# Patient Record
Sex: MALE | Race: WHITE | URBAN - METROPOLITAN AREA
[De-identification: names, ages, dates, MRNs, and addresses within clinical notes are randomized per-mention and may not be internally consistent; named-entity substitution may affect disease eponyms.]

---

## 2020-06-18 ENCOUNTER — OFFICE VISIT (OUTPATIENT)
Dept: ORTHOPEDICS | Facility: CLINIC | Age: 40
End: 2020-06-18
Payer: COMMERCIAL

## 2020-06-18 VITALS — HEIGHT: 72 IN | BODY MASS INDEX: 26.68 KG/M2 | WEIGHT: 197 LBS

## 2020-06-18 DIAGNOSIS — Q65.89 CONGENITAL HIP DYSPLASIA: Primary | ICD-10-CM

## 2020-06-18 RX ORDER — FLUOXETINE 40 MG/1
40 CAPSULE ORAL
COMMUNITY
Start: 2019-09-13

## 2020-06-18 ASSESSMENT — HOOS S4: HOW SEVERE IS YOUR HIP JOINT STIFFNESS AFTER FIRST WAKENING IN THE MORNING?: MILD

## 2020-06-18 ASSESSMENT — ACTIVITIES OF DAILY LIVING (ADL)
ADL_SUM: 10
ADL_SUBSCALE_SCORE: 85.29
ADL_MEAN: .58

## 2020-06-18 ASSESSMENT — MIFFLIN-ST. JEOR: SCORE: 1846.59

## 2020-06-18 NOTE — PROGRESS NOTES
Assessment: This is a 40 year old with bilateral hip dysplasia associated with cartilage changes. We discussed the diagnosis and the treatment options. We discussed that I do not see a roll for isolated arthroscopy in the classic dysplasia population and that I would be very cautious in pursuing a periacteabular osteotomy at 40 with Tonnis changes greater than 1. I reviewed his radiographs with him and we went over ALTAGRACIA and answered all the patient's questions. We discussed fitness activities with dysplasia, that I would not recommend distance running, that deep flexion activities are probably not going to be well tolerated.     Plan:  Non-operative management bilateral hip dysplasia    Chief Complaint: Consult (Left Acetabular labrum  tear )    Physician:  Referred Self    HPI: Dusty Tejada is a 40 year old male who presents today for evaluation of    Symptom Profile  Location of symptoms:  Groin   Onset: insidious   Trend: getting better with activity modifications.    Duration of symptoms: around months   Quality of symptoms: aching, sharp/stabbing  Severity:mild to moderate   Alleviate:activity modification   Exacerbating: activities   Previous Treatments: Previous treatments include activity modification, oral pain medication,     Current Status:  Results of the patient s Hip Disability and Osteoarthritis Outcome Score (HOOS)  are as follows (0-100 scales with 100 being the theoretical best):  Pain: 77.5   Symptoms:90   ADLs:85.29   Sports/Recreation:81.25   Quality of Life:43.75  ([email or web address].nu/)  [facility] Activity Score: 7, jogs for exercise    MEDICAL HISTORY: History reviewed. No pertinent past medical history.    Medications:     Current Outpatient Medications:      FLUoxetine (PROZAC) 40 MG capsule, Take 40 mg by mouth, Disp: , Rfl:     Allergies: Cefaclor and Sulfa drugs    SURGICAL HISTORY: History reviewed. No pertinent surgical history.     FAMILY HISTORY: History reviewed. No pertinent family  "history.    SOCIAL HISTORY:   Social History     Tobacco Use     Smoking status: Never Smoker     Smokeless tobacco: Never Used   Substance Use Topics     Alcohol use: Not on file   Working, [employer]     REVIEW OF SYSTEMS:  The comprehensive review of systems from the intake form was reviewed with the patient.  No fever, weight change or fatigue. No dry eyes. No oral ulcers, sore throat or voice change. No palpitations, syncope, angina or edema.  No chest pain, excessive sleepiness, shortness of breath or hemoptysis.   No abdominal pain, nausea, vomiting, diarrhea or heartburn.  No skin rash. No focal weakness or numbness. No bleeding or lymphadenopathy. No rhinitis or hives.     Exam:  On physical examination the patient appears the stated age, is in no acute distress, affectThe is appropriate, and breathing is non-labored.  Vitals are documented in the EMR and have been reviewed:    Ht 1.829 m (6')   Wt 89.4 kg (197 lb)   BMI 26.72 kg/m    6' 0\"  Body mass index is 26.72 kg/m .    Rises from chair:easily   Gait:normal   Trendelenburg test: normal     RIGHT hip subjective: not irritated   Abd: 30  Add: 10  PFC:  Flexion: 95  IRF:5  ERF: 30  Impingement test:-    LEFT hip subjective: symmetric exam ROM  Abd:   Add:  PFC:  Flexion:  IRF:  ERF:  Impingement test: +    Distally, the circulatory, motor, and sensation exam is intact with 5/5 EHL, gastroc-soleus, and tibialis anterior.  Sensation to light touch is intact.  Dorsalis pedis and posterior tibialis pulses are palpable.  There are no sores on the feet, no bruising, and no lymphedema.    X-rays:   LCE 17 degrees  Tonnis angle 15 degrees   Tonnis grade 1-2 on right and grade 2 on the left    MR with degenerative hypertrophic labrum. Anterior-medial acetabular bone edema and lateral cartilage delamination.     "

## 2020-06-18 NOTE — LETTER
Date:June 25, 2020      Patient was self referred, no letter generated. Do not send.        [facility]

## 2020-06-18 NOTE — LETTER
6/18/2020         RE: Dusty Tejada  [address] [facility] [id number]        Dear Colleague,    Thank you for referring your patient, Dusty Tejada, to the [facility]. Please see a copy of my visit note below.    Assessment: This is a 40 year old with bilateral hip dysplasia associated with cartilage changes. We discussed the diagnosis and the treatment options. We discussed that I do not see a roll for isolated arthroscopy in the classic dysplasia population and that I would be very cautious in pursuing a periacteabular osteotomy at 40 with Tonnis changes greater than 1. I reviewed his radiographs with him and we went over ALTAGRACIA and answered all the patient's questions. We discussed fitness activities with dysplasia, that I would not recommend distance running, that deep flexion activities are probably not going to be well tolerated.     Plan:  Non-operative management bilateral hip dysplasia    Chief Complaint: Consult (Left Acetabular labrum  tear )    Physician:  Referred Self    HPI: Dusty Tejada is a 40 year old male who presents today for evaluation of    Symptom Profile  Location of symptoms:  Groin   Onset: insidious   Trend: getting better with activity modifications.    Duration of symptoms: around months   Quality of symptoms: aching, sharp/stabbing  Severity:mild to moderate   Alleviate:activity modification   Exacerbating: activities   Previous Treatments: Previous treatments include activity modification, oral pain medication,     Current Status:  Results of the patient s Hip Disability and Osteoarthritis Outcome Score (HOOS)  are as follows (0-100 scales with 100 being the theoretical best):  Pain: 77.5   Symptoms:90   ADLs:85.29   Sports/Recreation:81.25   Quality of Life:43.75  ([email or web address].nu/)  [facility] Activity Score: 7, jogs for exercise    MEDICAL HISTORY: History reviewed. No pertinent past medical history.    Medications:     Current Outpatient  "Medications:      FLUoxetine (PROZAC) 40 MG capsule, Take 40 mg by mouth, Disp: , Rfl:     Allergies: Cefaclor and Sulfa drugs    SURGICAL HISTORY: History reviewed. No pertinent surgical history.     FAMILY HISTORY: History reviewed. No pertinent family history.    SOCIAL HISTORY:   Social History     Tobacco Use     Smoking status: Never Smoker     Smokeless tobacco: Never Used   Substance Use Topics     Alcohol use: Not on file   Working, [employer]     REVIEW OF SYSTEMS:  The comprehensive review of systems from the intake form was reviewed with the patient.  No fever, weight change or fatigue. No dry eyes. No oral ulcers, sore throat or voice change. No palpitations, syncope, angina or edema.  No chest pain, excessive sleepiness, shortness of breath or hemoptysis.   No abdominal pain, nausea, vomiting, diarrhea or heartburn.  No skin rash. No focal weakness or numbness. No bleeding or lymphadenopathy. No rhinitis or hives.     Exam:  On physical examination the patient appears the stated age, is in no acute distress, affectThe is appropriate, and breathing is non-labored.  Vitals are documented in the EMR and have been reviewed:    Ht 1.829 m (6')   Wt 89.4 kg (197 lb)   BMI 26.72 kg/m    6' 0\"  Body mass index is 26.72 kg/m .    Rises from chair:easily   Gait:normal   Trendelenburg test: normal     RIGHT hip subjective: not irritated   Abd: 30  Add: 10  PFC:  Flexion: 95  IRF:5  ERF: 30  Impingement test:-    LEFT hip subjective: symmetric exam ROM  Abd:   Add:  PFC:  Flexion:  IRF:  ERF:  Impingement test: +    Distally, the circulatory, motor, and sensation exam is intact with 5/5 EHL, gastroc-soleus, and tibialis anterior.  Sensation to light touch is intact.  Dorsalis pedis and posterior tibialis pulses are palpable.  There are no sores on the feet, no bruising, and no lymphedema.    X-rays:   LCE 17 degrees  Tonnis angle 15 degrees   Tonnis grade 1-2 on right and grade 2 on the left    MR with degenerative " hypertrophic labrum. Anterior-medial acetabular bone edema and lateral cartilage delamination.       Again, thank you for allowing me to participate in the care of your patient.        Sincerely,        Albaro Renae MD

## 2020-06-18 NOTE — NURSING NOTE
Reason For Visit:   Chief Complaint   Patient presents with     Consult     Left Acetabular labrum  tear        Ht 1.829 m (6')   Wt 89.4 kg (197 lb)   BMI 26.72 kg/m           [facility]over[facility] Dewitt ATC